# Patient Record
Sex: FEMALE | Race: WHITE | NOT HISPANIC OR LATINO | URBAN - NONMETROPOLITAN AREA
[De-identification: names, ages, dates, MRNs, and addresses within clinical notes are randomized per-mention and may not be internally consistent; named-entity substitution may affect disease eponyms.]

---

## 2017-03-22 NOTE — PATIENT DISCUSSION
The patient is at high risk for a choroidal neovascular membrane. NO DEF CNV SEEN TODAY. Dry ARMD is responsible for some decrease in vision.

## 2019-09-04 NOTE — PATIENT DISCUSSION
IOP REMAINS ON THE HIGH SIDE - RECOM EVAL DR MORRIS AS HER IOP IS ON HIGH SIDE FOR HER DISC CHANGES.

## 2020-02-03 NOTE — PATIENT DISCUSSION
2 3 20 - C/O DIFF WITH [facility] AFTER SHE TAKES HER IOP DROPS - RECOM REEVAL DR CUI FOR ALTERNATIVE TX OPTIONS.

## 2021-05-24 ENCOUNTER — IMPORTED ENCOUNTER (OUTPATIENT)
Dept: URBAN - NONMETROPOLITAN AREA CLINIC 1 | Facility: CLINIC | Age: 46
End: 2021-05-24

## 2021-05-24 PROBLEM — H00.12: Noted: 2021-05-24

## 2021-05-24 PROCEDURE — 92002 INTRM OPH EXAM NEW PATIENT: CPT

## 2021-05-24 NOTE — PATIENT DISCUSSION
Ben FOSTER-  discussed findings w/patient-  start Tobradex QID OD x 7 days -  start hot compresses at least BID -  RTC as scheduled or prn

## 2021-10-08 NOTE — PATIENT DISCUSSION
10 8 21 SUSP FOR WET AMD TEMP MARGIN - NO SIG LEAK ON FA BUT DIF STUDY - RECOM REPEAT AND RESCAN 2 WKS.

## 2021-11-03 NOTE — PATIENT DISCUSSION
No retinal holes or tears seen on exam. Recommended observation. We reviewed the signs and symptoms of retinal tear/retinal detachment and the importance of prompt evaluation should there be increasing floaters, new flashing lights, or decreasing peripheral vision in either eye at any time. Patient understands condition, prognosis and need for follow up care. unable to assess; pt nonverbal

## 2021-11-03 NOTE — PATIENT DISCUSSION
11 3 [address]. no chest pain/no claudication/no peripheral edema/no dyspnea on exertion/no palpitations

## 2021-11-12 NOTE — PATIENT DISCUSSION
CAROTID 11 11 21 SHOWS <50% STENOSIS BILATERAL WITH INCIDENTAL FINDINGS OF BILATERAL THYROID NODULES INCLUDING 1.1CM THYROID NODULE IN THE RIGHT THYROID LOBE - EVAL WITH PCP.

## 2022-04-09 ASSESSMENT — VISUAL ACUITY
OU_CC: 20/25-
OS_CC: 20/25
OD_CC: 20/25

## 2022-04-09 ASSESSMENT — TONOMETRY
OD_IOP_MMHG: 16
OS_IOP_MMHG: 16

## 2022-11-09 NOTE — PATIENT DISCUSSION
IOP ON HIGH SIDE FOR DISC CHANGES - RECOM F/U DR Chinedu Muñoz - ? STEROID RESPONSE - STOP EYESUVIS - DID NOT WANT TO GO BACK TO DR SHAH

## 2022-12-12 NOTE — PATIENT DISCUSSION
BMI Within normal limits, continue current management. bilateral upper extremity Active ROM was WFL (within functional limits)/bilateral  lower extremity Active ROM was WFL (within functional limits)

## 2022-12-12 NOTE — PATIENT DISCUSSION
12 12 22 PROM LOULOU - STILL PRESENT - RECOM F/U DR HALLMAN - INSTURCTED TO RESUME PF AT Q 2 WHILE AWAKE.

## 2022-12-15 NOTE — PATIENT DISCUSSION
12 15 22 STABBING PAIN FOR A SECOND - NO OCULAR ETIOLOGY SEEN - PT WANTS TO GO TO [facility] TO SEE DR CATHY LOVE TO SEE IF SHE CAN GET HER ON A REGIMENT THAT IS MORE COMFORTABLE.

## 2022-12-15 NOTE — PATIENT DISCUSSION
12 14 22 SAID VA WENT BLACK - BUT LOOKS MORE LIKE LOULOU WAS PROBLEM - MUCH BETTER TODAY - DISCUSSED WITH FAMILY - TO HOLD OFF OF ON AMAUROSIS WORK UP.

## 2022-12-15 NOTE — PATIENT DISCUSSION
12 12 22 PROM LOULOU - STILL PRESENT - RECOM F/U DR HALLMAN - INSTURCTED TO RESUME PRESERVATIVE FREE AT Q 2 WHILE AWAKE.

## 2023-10-09 NOTE — PATIENT DISCUSSION
DOES NOT APPEAR VISUALLY SIGNIFICANT TODAY. Niacinamide Counseling: I recommended taking niacin or niacinamide, also know as vitamin B3, twice daily. Recent evidence suggests that taking vitamin B3 (500 mg twice daily) can reduce the risk of actinic keratoses and non-melanoma skin cancers. Side effects of vitamin B3 include flushing and headache.